# Patient Record
Sex: FEMALE | URBAN - METROPOLITAN AREA
[De-identification: names, ages, dates, MRNs, and addresses within clinical notes are randomized per-mention and may not be internally consistent; named-entity substitution may affect disease eponyms.]

---

## 2017-10-06 ENCOUNTER — OFFICE VISIT (OUTPATIENT)
Dept: SURGERY | Facility: CLINIC | Age: 47
End: 2017-10-06

## 2017-10-06 VITALS
SYSTOLIC BLOOD PRESSURE: 134 MMHG | DIASTOLIC BLOOD PRESSURE: 86 MMHG | WEIGHT: 173.81 LBS | HEIGHT: 66 IN | HEART RATE: 66 BPM | BODY MASS INDEX: 27.93 KG/M2

## 2017-10-06 DIAGNOSIS — N62 MACROMASTIA: Primary | ICD-10-CM

## 2017-10-06 PROCEDURE — 99203 OFFICE O/P NEW LOW 30 MIN: CPT | Performed by: SURGERY
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
## 2023-10-16 ENCOUNTER — TELEPHONE (OUTPATIENT)
Dept: INTERNAL MEDICINE | Age: 53
End: 2023-10-16

## 2023-12-12 ENCOUNTER — APPOINTMENT (OUTPATIENT)
Dept: INTERNAL MEDICINE | Age: 53
End: 2023-12-12

## 2024-05-06 ENCOUNTER — HOSPITAL ENCOUNTER (EMERGENCY)
Facility: HOSPITAL | Age: 54
Discharge: HOME | End: 2024-05-06
Payer: COMMERCIAL

## 2024-05-06 VITALS
BODY MASS INDEX: 30.05 KG/M2 | WEIGHT: 187 LBS | RESPIRATION RATE: 20 BRPM | HEART RATE: 65 BPM | TEMPERATURE: 98 F | SYSTOLIC BLOOD PRESSURE: 147 MMHG | OXYGEN SATURATION: 99 % | HEIGHT: 66 IN | DIASTOLIC BLOOD PRESSURE: 92 MMHG

## 2024-05-06 DIAGNOSIS — S61.412A LACERATION OF LEFT HAND WITHOUT FOREIGN BODY, INITIAL ENCOUNTER: Primary | ICD-10-CM

## 2024-05-06 PROCEDURE — 90471 IMMUNIZATION ADMIN: CPT

## 2024-05-06 PROCEDURE — 99283 EMERGENCY DEPT VISIT LOW MDM: CPT

## 2024-05-06 RX ORDER — LIDOCAINE HYDROCHLORIDE 10 MG/ML
20 INJECTION, SOLUTION EPIDURAL; INFILTRATION; INTRACAUDAL; PERINEURAL ONCE
Status: COMPLETED | OUTPATIENT
Start: 2024-05-06 | End: 2024-05-06

## 2024-05-06 RX ORDER — ACETAMINOPHEN 325 MG/1
650 TABLET ORAL ONCE
Status: COMPLETED | OUTPATIENT
Start: 2024-05-06 | End: 2024-05-06

## 2024-05-07 NOTE — ED INITIAL ASSESSMENT (HPI)
Patient cut her left ring finger/hand area with a Knife this morning. Patient stated she tried ignoring it today but she states her hand is starting to hurt and she is unable to make a fist. 1cm Lac noted, deep. Pt. Able to bend finger. Patient not sure of Tetanus status.

## 2024-05-07 NOTE — ED PROVIDER NOTES
Patient Seen in: [facility] Emergency Department      History     Chief Complaint   Patient presents with    Laceration/Abrasion     Stated Complaint: L finger lac    Subjective:   54yo/f w no chronic medical problems reports with a left hand laceration. She was using a knife to open something, it slipped and cut her hand at the base of her ring finger. Applied a dressing, pain worsened throughout the day. No deformity. No weakness. No pain to nailbed or boy of hand.             Objective:   History reviewed. No pertinent past medical history.           Past Surgical History:   Procedure Laterality Date    Skin tissue procedure unlisted                  Social History     Socioeconomic History    Marital status:    Tobacco Use    Smoking status: Never    Smokeless tobacco: Never   Vaping Use    Vaping status: Never Used   Substance and Sexual Activity    Alcohol use: Yes     Alcohol/week: 2.0 standard drinks of alcohol     Types: 2 Glasses of wine per week     Comment: occ    Drug use: Yes     Types: Cannabis     Comment: Edible- Today     Social Determinants of Health     Financial Resource Strain: Low Risk  (4/21/2022)    Received from [facility]    Overall Financial Resource Strain (CARDIA)     Difficulty of Paying Living Expenses: Not hard at all   Food Insecurity: No Food Insecurity (4/21/2022)    Received from [facility]    Hunger Vital Sign     Worried About Running Out of Food in the Last Year: Never true     Ran Out of Food in the Last Year: Never true   Transportation Needs: No Transportation Needs (4/21/2022)    Received from [facility]    PRAPARE - Transportation     Lack of Transportation (Medical): No     Lack of Transportation (Non-Medical): No    Received from [facility]              Review of Systems   All other systems reviewed and are negative.      Positive for stated complaint: L finger lac  Other  systems are as noted in HPI.  Constitutional and vital signs reviewed.      All other systems reviewed and negative except as noted above.    Physical Exam     ED Triage Vitals [05/06/24 2059]   BP (!) 147/92   Pulse 65   Resp 20   Temp 97.5 °F (36.4 °C)   Temp src Temporal   SpO2 99 %   O2 Device None (Room air)       Current:BP (!) 147/92   Pulse 65   Temp 97.5 °F (36.4 °C) (Temporal)   Resp 20   Ht 167.6 cm (5' 6\")   Wt 84.8 kg   LMP 10/08/2023 (Approximate)   SpO2 99%   BMI 30.18 kg/m²         Physical Exam  Vitals and nursing note reviewed.   Constitutional:       General: She is not in acute distress.     Appearance: She is well-developed.   HENT:      Head: Normocephalic and atraumatic.      Nose: Nose normal.      Mouth/Throat:      Mouth: Mucous membranes are moist.   Eyes:      Conjunctiva/sclera: Conjunctivae normal.      Pupils: Pupils are equal, round, and reactive to light.   Cardiovascular:      Rate and Rhythm: Normal rate and regular rhythm.      Heart sounds: Normal heart sounds.   Pulmonary:      Effort: Pulmonary effort is normal.      Breath sounds: Normal breath sounds.   Abdominal:      General: Bowel sounds are normal.      Palpations: Abdomen is soft.   Musculoskeletal:         General: No tenderness or deformity. Normal range of motion.      Cervical back: Normal range of motion and neck supple.   Skin:     General: Skin is warm and dry.      Capillary Refill: Capillary refill takes less than 2 seconds.      Findings: No rash.      Comments: Laceration 3-4 webbing, linear, no crepitus, equal hand grasp   Neurological:      General: No focal deficit present.      Mental Status: She is alert and oriented to person, place, and time.      GCS: GCS eye subscore is 4. GCS verbal subscore is 5. GCS motor subscore is 6.      Cranial Nerves: No cranial nerve deficit.      Gait: Gait normal.               ED Course   Labs Reviewed - No data to display     Lac repair  1cm webbing 3-4 left  hand  No exposed bone or  tendon  Full active rom  Equal hand grasp  Tdap updated    1% lido, 2ml  5-0 sutures x 3                MDM                       Medical Decision Making  52yo/f w hx and exam as stated; finger laceration    Full rom  No exposed bone or tendon  No weakness  No edema  Updated tdap  Easily repaired      Risk  OTC drugs.  Prescription drug management.        Disposition and Plan     Clinical Impression:  1. Laceration of left hand without foreign body, initial encounter         Disposition:  Discharge  5/6/2024 11:19 pm    Follow-up:  Hoang José, DO  [address] [place] [address]  [phone]    Follow up in 1 week(s)  For suture removal          Medications Prescribed:  Discharge Medication List as of 5/6/2024 11:25 PM

## 2024-05-29 ENCOUNTER — ORDER TRANSCRIPTION (OUTPATIENT)
Dept: ADMINISTRATIVE | Facility: HOSPITAL | Age: 54
End: 2024-05-29

## 2024-05-29 ENCOUNTER — TELEPHONE (OUTPATIENT)
Dept: OBGYN CLINIC | Facility: CLINIC | Age: 54
End: 2024-05-29

## 2024-05-29 DIAGNOSIS — Z13.6 SCREENING FOR CARDIOVASCULAR CONDITION: Primary | ICD-10-CM

## 2024-08-13 ENCOUNTER — OFFICE VISIT (OUTPATIENT)
Dept: OBGYN CLINIC | Facility: CLINIC | Age: 54
End: 2024-08-13
Payer: COMMERCIAL

## 2024-08-13 VITALS
WEIGHT: 193.81 LBS | HEART RATE: 70 BPM | DIASTOLIC BLOOD PRESSURE: 83 MMHG | BODY MASS INDEX: 31 KG/M2 | SYSTOLIC BLOOD PRESSURE: 128 MMHG

## 2024-08-13 DIAGNOSIS — N95.1 PERIMENOPAUSE: Primary | ICD-10-CM

## 2024-08-13 PROCEDURE — 99202 OFFICE O/P NEW SF 15 MIN: CPT | Performed by: OBSTETRICS & GYNECOLOGY

## 2024-08-14 NOTE — PROGRESS NOTES
Joyce Kelley is a 53 year old female  Patient's last menstrual period was 10/08/2023 (approximate).   Chief Complaint   Patient presents with    Physical     Annual establish care to discuss polyp removal 2017      New pt.   Here to discuss 2 issues.       Has history of endometrial polyp that was removed hysteroscopically in 2017.   Her previous doctor did ultrasound and ?thickened lining and attempted e-bx in office which was unsuccessful due to cervical stenosis.  Will need records.  No abnormal bleeding.     2.   LMP 10/2023.   Previous period in 2022.  Having hot flashes, anxiety, palpitations.  Has been having increased stress.      OBSTETRICS HISTORY:  OB History    Para Term  AB Living   1 1 1 0 0 1   SAB IAB Ectopic Multiple Live Births   0 0 0 0 1       GYNE HISTORY        MEDICAL HISTORY:  History reviewed. No pertinent past medical history.  Past Surgical History:   Procedure Laterality Date          Hysteroscopy  2017    polyp removal    Skin tissue procedure unlisted         SOCIAL HISTORY:  Social History     Socioeconomic History    Marital status:    Tobacco Use    Smoking status: Never    Smokeless tobacco: Never   Vaping Use    Vaping status: Never Used   Substance and Sexual Activity    Alcohol use: Yes     Alcohol/week: 2.0 standard drinks of alcohol     Types: 2 Glasses of wine per week     Comment: occ    Drug use: Yes     Types: Cannabis     Comment: Edible- Today     Social Determinants of Health     Financial Resource Strain: Medium Risk (2024)    Received from [facility]    Overall Financial Resource Strain (CARDIA)     Difficulty of Paying Living Expenses: Somewhat hard   Food Insecurity: No Food Insecurity (2024)    Received from [facility]    Hunger Vital Sign     Worried About Running Out of Food in the Last Year: Never true     Ran Out of Food in the Last Year: Never true   Transportation  Needs: No Transportation Needs (6/24/2024)    Received from [facility]    PRAPARE - Transportation     Lack of Transportation (Medical): No     Lack of Transportation (Non-Medical): No   Housing Stability: High Risk (6/24/2024)    Received from [facility]    Housing Stability Vital Sign     Unable to Pay for Housing in the Last Year: Yes     Number of Places Lived in the Last Year: 1     In the last 12 months, was there a time when you did not have a steady place to sleep or slept in a shelter (including now)?: No       MEDICATIONS:  No current outpatient medications on file.       ALLERGIES:  No Known Allergies      PHYSICAL EXAM:   /83   Pulse 70   Wt 193 lb 12.8 oz (87.9 kg)   LMP 10/08/2023 (Approximate)   BMI 31.28 kg/m²   Body mass index is 31.28 kg/m².    Constitutional: well developed, well nourished      Assessment & Plan:  1. Perimenopause  Will get old records in regards to possible polyp.  Discussed management for hot flashes-- HRT vs Effexor vs Veozah.  Will need to see PCP for palpitations.  Pt wants to monitor symptoms for now.  Will make another appointment for annual and to discuss old records.        Requested Prescriptions      No prescriptions requested or ordered in this encounter

## 2024-10-09 ENCOUNTER — TELEPHONE (OUTPATIENT)
Dept: OBGYN CLINIC | Facility: CLINIC | Age: 54
End: 2024-10-09

## 2024-10-11 ENCOUNTER — TELEPHONE (OUTPATIENT)
Dept: OBGYN CLINIC | Facility: CLINIC | Age: 54
End: 2024-10-11

## 2024-10-11 NOTE — TELEPHONE ENCOUNTER
Patient states she wanted to make the appointment Dr. Wilson recommended asap and Dr. Wilson had no openings until next year.  Patient said the PSR told her the appointment with Dr. Funk was the soonest appointment.  Patient informed we will check with Dr. Wilson to see if patient can be added to her schedule instead.  Message to Dr. Wilson.

## 2024-10-11 NOTE — TELEPHONE ENCOUNTER
Records received via fax from [facility]. Patient saw Dr. Wilson on 8/13/23 for NP annual and to discuss polyp removal 2017. See Dr. Wilson's 8/13/24 office notes below:    \"Assessment & Plan:  1. Perimenopause  Will get old records in regards to possible polyp.  Discussed management for hot flashes-- HRT vs Effexor vs Veozah.  Will need to see PCP for palpitations.  Pt wants to monitor symptoms for now.  Will make another appointment for annual and to discuss old records\".    Patient has consult appointment scheduled with Dr. Funk on 11/14/24.    Left message to call back.    Message to RN- when patient's calls back, please confirm if patient wants to have appointment with Dr. Wilson or does she want to keep appointment with Dr. Funk. Records placed in front office fax bin for now. Thank you.

## 2024-10-11 NOTE — TELEPHONE ENCOUNTER
She needs old records from 2017 prior to visit.  If she has records and she rather sees me, then ok to use [facility].  If she does not have records, the visit, will not be useful.

## 2024-10-12 NOTE — TELEPHONE ENCOUNTER
Left message to call back. Records received are from [phone]. We have not received any records from 2017.    Message to RN- please have patient send old records from 2017. See Dr. Wilson 8/13/24 office notes below:    \"New pt.   Here to discuss 2 issues.        Has history of endometrial polyp that was removed hysteroscopically in 2017.   Her previous doctor did ultrasound and ?thickened lining and attempted e-bx in office which was unsuccessful due to cervical stenosis.  Will need records.  No abnormal bleeding\".

## 2024-10-14 NOTE — TELEPHONE ENCOUNTER
Patient called and informed that we need records from her 2017 surgery before we can make follow up appointment. Patient states understanding and will reach out to them today for the records. Patient states she will call back once she hears back from [facility].

## 2024-10-22 NOTE — TELEPHONE ENCOUNTER
Patient calling to schedule appt with Dr. Wilson to discuss old records. Patient accepts appt on 12/3 with Dr. Wilson at [facility]. RN offered to place patient on wait list in case a sooner appt opens up. Patient agreed, and was appreciative.

## 2024-10-22 NOTE — TELEPHONE ENCOUNTER
Records from [facility] received and placed in JLKs appt folder slot in front MA office for pts appt on 12/3.

## 2024-11-14 ENCOUNTER — TELEPHONE (OUTPATIENT)
Dept: PEDIATRICS CLINIC | Facility: CLINIC | Age: 54
End: 2024-11-14

## 2024-11-14 DIAGNOSIS — N95.1 MENOPAUSAL SYMPTOMS: Primary | ICD-10-CM

## 2024-11-14 NOTE — TELEPHONE ENCOUNTER
Patient requesting for Dr. Wilson to review records for possible polyp. See 10/11/24 telephone encounter. Reports hx of hysterectomy. Asking for hormone labs. Reports having hot flashes, memory fog, insomnia, fatigue, joint pain and moodiness for about one year. Patient has follow up appointment with Dr. Wilson on 12/3.

## 2024-11-14 NOTE — TELEPHONE ENCOUNTER
Patient came in late for appointment with Dr. Funk and was told she needed to reschedule. Patient states she does have an upcoming appointment with Dr. Wilson and does not want to reschedule at this time.    Future Appointments   Date Time Provider Department Center   12/3/2024  3:00 PM [facility]   1/13/2025 10:30 AM Ayse Gomez MD [facility]     Patient wondering if there is anyway she can just have a lab order sent to check her hormones so she can discuss results with Dr. Wilson on 12/3 appointment. Patient wondering if she can get a call back to discuss. Please advise

## 2024-11-20 NOTE — TELEPHONE ENCOUNTER
Last menstrual period 10/8/23 approximate  Spoke with patient she is aware of ok for lab work, patient prefers [facility], labs sent to [facility].  See 10/11/24 telephone encounter: Patient requesting records to be reviewed by Dr. Wilson prior to appointment.  Records placed at Dr. Wilson desk for review, prior to 12/3/24 appointment.

## 2024-11-20 NOTE — TELEPHONE ENCOUNTER
To Dr. Wilson please review below message, patient requesting lab work prior to appointment 12/3/24